# Patient Record
Sex: MALE | Race: ASIAN | NOT HISPANIC OR LATINO
[De-identification: names, ages, dates, MRNs, and addresses within clinical notes are randomized per-mention and may not be internally consistent; named-entity substitution may affect disease eponyms.]

---

## 2023-07-21 ENCOUNTER — APPOINTMENT (OUTPATIENT)
Dept: SURGERY | Facility: CLINIC | Age: 83
End: 2023-07-21
Payer: MEDICARE

## 2023-07-21 VITALS
RESPIRATION RATE: 18 BRPM | TEMPERATURE: 97.6 F | BODY MASS INDEX: 23.3 KG/M2 | HEART RATE: 70 BPM | WEIGHT: 145 LBS | DIASTOLIC BLOOD PRESSURE: 71 MMHG | SYSTOLIC BLOOD PRESSURE: 181 MMHG | HEIGHT: 66 IN | OXYGEN SATURATION: 98 %

## 2023-07-21 DIAGNOSIS — R22.0 LOCALIZED SWELLING, MASS AND LUMP, HEAD: ICD-10-CM

## 2023-07-21 DIAGNOSIS — Z78.9 OTHER SPECIFIED HEALTH STATUS: ICD-10-CM

## 2023-07-21 PROBLEM — Z00.00 ENCOUNTER FOR PREVENTIVE HEALTH EXAMINATION: Status: ACTIVE | Noted: 2023-07-21

## 2023-07-21 PROCEDURE — 99204 OFFICE O/P NEW MOD 45 MIN: CPT

## 2023-07-21 RX ORDER — AMOXICILLIN AND CLAVULANATE POTASSIUM 875; 125 MG/1; MG/1
875-125 TABLET, COATED ORAL
Qty: 14 | Refills: 0 | Status: ACTIVE | COMMUNITY
Start: 2023-07-21 | End: 1900-01-01

## 2023-07-21 NOTE — PLAN
[FreeTextEntry1] : \par - I spoke with the patient regarding the findings, which are consistent with cyst of the right scalp and I offered the patient right scalp mass excision\par - We discussed the indications, risks, benefits, and alternatives of the procedure to which the patient stated that he understood, gave consent, and all his questions were answered\par - Patient to follow with PCP for medical risk stratification\par - Surgical planning\par - Will also prescribe PO Antibiotics for the drainage at the scalp mass region

## 2023-07-21 NOTE — PHYSICAL EXAM
[Respiratory Effort] : normal respiratory effort [Normal Rate and Rhythm] : normal rate and rhythm [Alert] : alert [Oriented to Place] : oriented to place [Oriented to Time] : oriented to time [Calm] : calm [de-identified] : NAD [de-identified] : Chronic skin discoloration and mild sebaceous-like drainage around the right scalp lesion without erythema [de-identified] : Approximately 2 cm mass of the right scalp located in the hair line above the right ear, non-TTP

## 2023-07-21 NOTE — HISTORY OF PRESENT ILLNESS
[de-identified] : Khmer translation via Phi Roberts\par \par Patient is a 83 year old male with PMHx of HTN, PSHx of scalp lesion excision x 2, who presents with right scalp lesion, which he states has been presents for the past 4 years. States that the current lesion is similar to other ones, does endorse having some drainage at times, does not endorse pain. No other symptoms. Denies smoking, occasional EtOH use. NKDA.

## 2023-08-04 ENCOUNTER — APPOINTMENT (OUTPATIENT)
Dept: SURGERY | Facility: CLINIC | Age: 83
End: 2023-08-04